# Patient Record
Sex: FEMALE | Race: BLACK OR AFRICAN AMERICAN | NOT HISPANIC OR LATINO | URBAN - METROPOLITAN AREA
[De-identification: names, ages, dates, MRNs, and addresses within clinical notes are randomized per-mention and may not be internally consistent; named-entity substitution may affect disease eponyms.]

---

## 2021-12-27 ENCOUNTER — OFFICE (OUTPATIENT)
Dept: URBAN - METROPOLITAN AREA CLINIC 102 | Facility: CLINIC | Age: 71
End: 2021-12-27

## 2021-12-27 VITALS
TEMPERATURE: 97.8 F | DIASTOLIC BLOOD PRESSURE: 103 MMHG | WEIGHT: 195 LBS | SYSTOLIC BLOOD PRESSURE: 152 MMHG | HEIGHT: 64 IN | HEART RATE: 74 BPM

## 2021-12-27 DIAGNOSIS — I50.9 HEART FAILURE, UNSPECIFIED: ICD-10-CM

## 2021-12-27 DIAGNOSIS — Z79.01 LONG TERM (CURRENT) USE OF ANTICOAGULANTS: ICD-10-CM

## 2021-12-27 PROCEDURE — 99203 OFFICE O/P NEW LOW 30 MIN: CPT

## 2021-12-27 NOTE — SERVICEHPINOTES
72 y/o female presents to office requesting colonoscopy. Her last procedure was in 2011 -- diverticulosis, internal hemorrhoids, otherwise normal. Reports BMs every other day, type 3-4, and occasionally needing to use a laxative. Drinking prune juice and eating fruits and vegetables help her go more regularly.  Denies hematochezia, melena, abdominal pain, weight loss, fever or chills. 
br
brPatient states she has recently been diagnosed with "congestive heart failure"  and is taking blood thinners. She is followed by [facility] cardiology group. She had to wear a heart monitor, unsure of what that revealed. Endorses shortness of breath with exertion, needs to take frequent breaks to rest. Denies cp, palpitations, dizziness.  
br

## 2022-03-31 PROBLEM — Z12.11 AVERAGE RISK SCREENING COLON: Status: ACTIVE | Noted: 2021-12-27
